# Patient Record
Sex: MALE | Race: WHITE | NOT HISPANIC OR LATINO | URBAN - METROPOLITAN AREA
[De-identification: names, ages, dates, MRNs, and addresses within clinical notes are randomized per-mention and may not be internally consistent; named-entity substitution may affect disease eponyms.]

---

## 2018-04-09 ENCOUNTER — IMPORTED ENCOUNTER (OUTPATIENT)
Dept: URBAN - METROPOLITAN AREA CLINIC 50 | Facility: CLINIC | Age: 73
End: 2018-04-09

## 2021-04-17 ASSESSMENT — VISUAL ACUITY
OD_BAT: 20/25
OD_CC: J1+
OS_CC: J1+
OD_SC: 20/50
OD_OTHER: 20/25. 20/30.
OS_OTHER: 20/40. 20/40.
OS_SC: 20/50
OS_BAT: 20/40

## 2021-04-17 ASSESSMENT — TONOMETRY
OS_IOP_MMHG: 18
OD_IOP_MMHG: 18

## 2021-11-16 ENCOUNTER — PREPPED CHART (OUTPATIENT)
Dept: URBAN - METROPOLITAN AREA CLINIC 53 | Facility: CLINIC | Age: 76
End: 2021-11-16

## 2022-11-01 NOTE — PATIENT DISCUSSION
Hit with a spring from a screen door in [address] [place] [address]. S/p repair and lensectomy. Eye has never seen well since then. Now NLP. Not painful.

## 2023-10-16 ENCOUNTER — NEW PATIENT (OUTPATIENT)
Dept: URBAN - METROPOLITAN AREA CLINIC 49 | Facility: LOCATION | Age: 78
End: 2023-10-16

## 2023-10-16 DIAGNOSIS — H43.811: ICD-10-CM

## 2023-10-16 DIAGNOSIS — H04.123: ICD-10-CM

## 2023-10-16 DIAGNOSIS — H25.13: ICD-10-CM

## 2023-10-16 PROCEDURE — 92004 COMPRE OPH EXAM NEW PT 1/>: CPT

## 2023-10-16 PROCEDURE — 92134 CPTRZ OPH DX IMG PST SGM RTA: CPT

## 2023-10-16 ASSESSMENT — VISUAL ACUITY
OU_CC: J1@14"
OS_GLARE: 20/40
OD_PH: 20/30
OU_SC: J2@14"
OD_GLARE: 20/30
OS_SC: 20/30
OD_SC: 20/50-1
OD_GLARE: 20/40
OS_GLARE: 20/50

## 2023-10-16 ASSESSMENT — KERATOMETRY
OS_AXISANGLE_DEGREES: 089
OS_AXISANGLE2_DEGREES: 179
OD_AXISANGLE_DEGREES: 0
OD_K1POWER_DIOPTERS: 44.50
OS_K2POWER_DIOPTERS: 45.25
OD_AXISANGLE2_DEGREES: 90
OD_K2POWER_DIOPTERS: 44.50
OS_K1POWER_DIOPTERS: 44.25

## 2023-10-16 ASSESSMENT — TONOMETRY
OS_IOP_MMHG: 18
OD_IOP_MMHG: 18